# Patient Record
Sex: FEMALE | Race: BLACK OR AFRICAN AMERICAN | NOT HISPANIC OR LATINO | Employment: UNEMPLOYED | URBAN - METROPOLITAN AREA
[De-identification: names, ages, dates, MRNs, and addresses within clinical notes are randomized per-mention and may not be internally consistent; named-entity substitution may affect disease eponyms.]

---

## 2023-09-12 PROBLEM — M79.10 MYALGIA: Status: ACTIVE | Noted: 2023-09-12

## 2023-09-12 PROBLEM — S69.92XA INJURY OF LEFT THUMB: Status: ACTIVE | Noted: 2023-09-12

## 2023-09-12 PROBLEM — R07.89 TIGHT CHEST: Status: ACTIVE | Noted: 2023-09-12

## 2023-09-12 PROBLEM — K90.9 MALABSORPTION (HHS-HCC): Status: ACTIVE | Noted: 2023-09-12

## 2023-09-12 PROBLEM — N93.9 ABNORMAL VAGINAL BLEEDING: Status: ACTIVE | Noted: 2023-09-12

## 2023-09-12 PROBLEM — M23.305 DERANGEMENT OF MEDIAL MENISCUS: Status: ACTIVE | Noted: 2023-09-12

## 2023-09-12 PROBLEM — E66.9 OBESITY: Status: ACTIVE | Noted: 2023-09-12

## 2023-09-12 PROBLEM — E11.9 DIABETES MELLITUS WITHOUT COMPLICATION (MULTI): Status: ACTIVE | Noted: 2023-09-12

## 2023-09-12 PROBLEM — E55.9 VITAMIN D DEFICIENCY: Status: ACTIVE | Noted: 2023-09-12

## 2023-09-12 PROBLEM — N39.3 URINARY, INCONTINENCE, STRESS FEMALE: Status: ACTIVE | Noted: 2023-09-12

## 2023-09-12 PROBLEM — R07.9 CHEST PAIN: Status: ACTIVE | Noted: 2023-09-12

## 2023-09-12 PROBLEM — I10 HYPERTENSIVE DISORDER: Status: ACTIVE | Noted: 2023-09-12

## 2023-09-12 PROBLEM — I50.9 HEART FAILURE (MULTI): Status: ACTIVE | Noted: 2023-09-12

## 2023-09-12 PROBLEM — G47.33 OBSTRUCTIVE SLEEP APNEA SYNDROME: Status: ACTIVE | Noted: 2023-09-12

## 2023-09-12 RX ORDER — SITAGLIPTIN AND METFORMIN HYDROCHLORIDE 500; 50 MG/1; MG/1
TABLET, FILM COATED ORAL
COMMUNITY
Start: 2017-08-03

## 2023-09-12 RX ORDER — OMEPRAZOLE 40 MG/1
1 CAPSULE, DELAYED RELEASE ORAL DAILY
COMMUNITY

## 2023-09-12 RX ORDER — ACETAMINOPHEN 160 MG/5ML
SUSPENSION ORAL
COMMUNITY

## 2023-09-12 RX ORDER — PEN NEEDLE, DIABETIC 30 GX3/16"
NEEDLE, DISPOSABLE MISCELLANEOUS
COMMUNITY
Start: 2013-09-09

## 2023-09-12 RX ORDER — INSULIN ASPART 100 [IU]/ML
INJECTION, SOLUTION INTRAVENOUS; SUBCUTANEOUS
COMMUNITY
Start: 2013-09-09

## 2023-09-12 RX ORDER — INSULIN GLARGINE 300 U/ML
INJECTION, SOLUTION SUBCUTANEOUS
COMMUNITY
Start: 2017-08-02

## 2023-09-12 RX ORDER — GUAIFENESIN 1200 MG
325 TABLET, EXTENDED RELEASE 12 HR ORAL EVERY 4 HOURS
COMMUNITY

## 2023-09-12 RX ORDER — AMLODIPINE BESYLATE 5 MG/1
5 TABLET ORAL DAILY
COMMUNITY
Start: 2014-10-01

## 2023-09-12 RX ORDER — LOSARTAN POTASSIUM AND HYDROCHLOROTHIAZIDE 12.5; 1 MG/1; MG/1
TABLET ORAL
COMMUNITY
Start: 2013-09-09

## 2023-09-12 RX ORDER — INSULIN GLARGINE 100 [IU]/ML
INJECTION, SOLUTION SUBCUTANEOUS
COMMUNITY
Start: 2013-09-09

## 2023-09-12 RX ORDER — IBUPROFEN 800 MG/1
1 TABLET ORAL 3 TIMES DAILY
COMMUNITY
Start: 2018-06-14

## 2023-09-12 RX ORDER — LOSARTAN POTASSIUM 100 MG/1
100 TABLET ORAL 2 TIMES DAILY
COMMUNITY
Start: 2014-10-01

## 2023-09-12 RX ORDER — AMITRIPTYLINE HYDROCHLORIDE 10 MG/1
10 TABLET, FILM COATED ORAL NIGHTLY
COMMUNITY

## 2023-09-12 RX ORDER — BLOOD-GLUCOSE METER
KIT MISCELLANEOUS
COMMUNITY
Start: 2018-03-08

## 2023-09-12 RX ORDER — CHOLECALCIFEROL (VITAMIN D3) 25 MCG
TABLET,CHEWABLE ORAL
COMMUNITY

## 2023-09-12 RX ORDER — CARVEDILOL 6.25 MG/1
6.25 TABLET ORAL DAILY
COMMUNITY
Start: 2013-11-04

## 2023-09-12 RX ORDER — ASPIRIN 81 MG/1
TABLET ORAL
COMMUNITY
Start: 2013-09-09

## 2023-10-16 DIAGNOSIS — E53.8 VITAMIN B12 DEFICIENCY: ICD-10-CM

## 2023-10-16 DIAGNOSIS — E55.9 VITAMIN D DEFICIENCY: Primary | ICD-10-CM

## 2023-10-16 DIAGNOSIS — E21.3 HYPERPARATHYROIDISM (MULTI): ICD-10-CM

## 2023-10-16 DIAGNOSIS — K90.9 INTESTINAL MALABSORPTION, UNSPECIFIED TYPE (HHS-HCC): ICD-10-CM

## 2023-11-13 ENCOUNTER — LAB (OUTPATIENT)
Dept: LAB | Facility: LAB | Age: 53
End: 2023-11-13
Payer: MEDICARE

## 2023-11-13 DIAGNOSIS — E53.8 VITAMIN B12 DEFICIENCY: ICD-10-CM

## 2023-11-13 DIAGNOSIS — E55.9 VITAMIN D DEFICIENCY: ICD-10-CM

## 2023-11-13 DIAGNOSIS — E21.3 HYPERPARATHYROIDISM (MULTI): ICD-10-CM

## 2023-11-13 DIAGNOSIS — E11.8 TYPE 2 DIABETES MELLITUS WITH UNSPECIFIED COMPLICATIONS (MULTI): Primary | ICD-10-CM

## 2023-11-13 DIAGNOSIS — K90.9 INTESTINAL MALABSORPTION, UNSPECIFIED TYPE (HHS-HCC): ICD-10-CM

## 2023-11-13 LAB
ALBUMIN SERPL-MCNC: 4.2 G/DL (ref 3.5–5)
ALP BLD-CCNC: 54 U/L (ref 35–125)
ALT SERPL-CCNC: 24 U/L (ref 5–40)
ANION GAP SERPL CALC-SCNC: 13 MMOL/L
AST SERPL-CCNC: 18 U/L (ref 5–40)
BASOPHILS # BLD AUTO: 0.01 X10*3/UL (ref 0–0.1)
BASOPHILS NFR BLD AUTO: 0.1 %
BILIRUB SERPL-MCNC: 0.2 MG/DL (ref 0.1–1.2)
BUN SERPL-MCNC: 20 MG/DL (ref 8–25)
CALCIUM SERPL-MCNC: 9.5 MG/DL (ref 8.5–10.4)
CHLORIDE SERPL-SCNC: 103 MMOL/L (ref 97–107)
CHOLEST SERPL-MCNC: 209 MG/DL (ref 133–200)
CHOLEST/HDLC SERPL: 2.1 {RATIO}
CO2 SERPL-SCNC: 22 MMOL/L (ref 24–31)
CREAT SERPL-MCNC: 0.8 MG/DL (ref 0.4–1.6)
CREAT UR-MCNC: 56 MG/DL
EOSINOPHIL # BLD AUTO: 0.01 X10*3/UL (ref 0–0.7)
EOSINOPHIL NFR BLD AUTO: 0.1 %
ERYTHROCYTE [DISTWIDTH] IN BLOOD BY AUTOMATED COUNT: 15.1 % (ref 11.5–14.5)
EST. AVERAGE GLUCOSE BLD GHB EST-MCNC: 160 MG/DL
FERRITIN SERPL-MCNC: 206 NG/ML (ref 13–150)
FOLATE SERPL-MCNC: >20 NG/ML (ref 4.2–19.9)
GFR SERPL CREATININE-BSD FRML MDRD: 88 ML/MIN/1.73M*2
GLUCOSE SERPL-MCNC: 141 MG/DL (ref 65–99)
HBA1C MFR BLD: 7.2 %
HCT VFR BLD AUTO: 44.6 % (ref 36–46)
HDLC SERPL-MCNC: 101 MG/DL
HGB BLD-MCNC: 14.6 G/DL (ref 12–16)
IMM GRANULOCYTES # BLD AUTO: 0.06 X10*3/UL (ref 0–0.7)
IMM GRANULOCYTES NFR BLD AUTO: 0.5 % (ref 0–0.9)
IRON SATN MFR SERPL: 26 % (ref 12–50)
IRON SERPL-MCNC: 75 UG/DL (ref 30–160)
LDLC SERPL CALC-MCNC: 92 MG/DL (ref 65–130)
LYMPHOCYTES # BLD AUTO: 2.39 X10*3/UL (ref 1.2–4.8)
LYMPHOCYTES NFR BLD AUTO: 21.6 %
MCH RBC QN AUTO: 28.9 PG (ref 26–34)
MCHC RBC AUTO-ENTMCNC: 32.7 G/DL (ref 32–36)
MCV RBC AUTO: 88 FL (ref 80–100)
MICROALBUMIN UR-MCNC: <12 MG/L (ref 0–23)
MICROALBUMIN/CREAT UR: NORMAL MG/G{CREAT}
MONOCYTES # BLD AUTO: 0.54 X10*3/UL (ref 0.1–1)
MONOCYTES NFR BLD AUTO: 4.9 %
NEUTROPHILS # BLD AUTO: 8.06 X10*3/UL (ref 1.2–7.7)
NEUTROPHILS NFR BLD AUTO: 72.8 %
NRBC BLD-RTO: 0 /100 WBCS (ref 0–0)
PLATELET # BLD AUTO: 247 X10*3/UL (ref 150–450)
POTASSIUM SERPL-SCNC: 4.5 MMOL/L (ref 3.4–5.1)
PROT SERPL-MCNC: 7 G/DL (ref 5.9–7.9)
PTH-INTACT SERPL-MCNC: 25.1 PG/ML (ref 18.5–88)
RBC # BLD AUTO: 5.06 X10*6/UL (ref 4–5.2)
SODIUM SERPL-SCNC: 138 MMOL/L (ref 133–145)
TIBC SERPL-MCNC: 293 UG/DL (ref 228–428)
TRIGL SERPL-MCNC: 79 MG/DL (ref 40–150)
UIBC SERPL-MCNC: 218 UG/DL (ref 110–370)
VIT B12 SERPL-MCNC: 1660 PG/ML (ref 211–946)
WBC # BLD AUTO: 11.1 X10*3/UL (ref 4.4–11.3)

## 2023-11-13 PROCEDURE — 36415 COLL VENOUS BLD VENIPUNCTURE: CPT

## 2023-11-13 PROCEDURE — 83540 ASSAY OF IRON: CPT

## 2023-11-13 PROCEDURE — 82607 VITAMIN B-12: CPT

## 2023-11-13 PROCEDURE — 82728 ASSAY OF FERRITIN: CPT

## 2023-11-13 PROCEDURE — 82652 VIT D 1 25-DIHYDROXY: CPT

## 2023-11-13 PROCEDURE — 84630 ASSAY OF ZINC: CPT

## 2023-11-13 PROCEDURE — 82570 ASSAY OF URINE CREATININE: CPT

## 2023-11-13 PROCEDURE — 85025 COMPLETE CBC W/AUTO DIFF WBC: CPT

## 2023-11-13 PROCEDURE — 82043 UR ALBUMIN QUANTITATIVE: CPT

## 2023-11-13 PROCEDURE — 84425 ASSAY OF VITAMIN B-1: CPT

## 2023-11-13 PROCEDURE — 83036 HEMOGLOBIN GLYCOSYLATED A1C: CPT

## 2023-11-13 PROCEDURE — 82746 ASSAY OF FOLIC ACID SERUM: CPT

## 2023-11-13 PROCEDURE — 80061 LIPID PANEL: CPT

## 2023-11-13 PROCEDURE — 82525 ASSAY OF COPPER: CPT

## 2023-11-13 PROCEDURE — 80053 COMPREHEN METABOLIC PANEL: CPT

## 2023-11-13 PROCEDURE — 83550 IRON BINDING TEST: CPT

## 2023-11-13 PROCEDURE — 83970 ASSAY OF PARATHORMONE: CPT

## 2023-11-15 LAB
1,25(OH)2D3 SERPL-MCNC: 73.4 PG/ML (ref 19.9–79.3)
COPPER SERPL-MCNC: 127.4 UG/DL (ref 80–155)
ZINC SERPL-MCNC: 71 UG/DL (ref 60–120)

## 2023-11-17 LAB — VIT B1 PYROPHOSHATE BLD-SCNC: 124 NMOL/L (ref 70–180)

## 2023-12-05 ENCOUNTER — HOSPITAL ENCOUNTER (OUTPATIENT)
Dept: RADIOLOGY | Facility: HOSPITAL | Age: 53
Discharge: HOME | End: 2023-12-05
Payer: MEDICARE

## 2023-12-05 DIAGNOSIS — Z12.31 ENCOUNTER FOR SCREENING MAMMOGRAM FOR MALIGNANT NEOPLASM OF BREAST: ICD-10-CM

## 2024-01-03 ENCOUNTER — HOSPITAL ENCOUNTER (OUTPATIENT)
Dept: RADIOLOGY | Facility: HOSPITAL | Age: 54
Discharge: HOME | End: 2024-01-03
Payer: MEDICARE

## 2024-01-03 VITALS — HEIGHT: 58 IN | BODY MASS INDEX: 27.71 KG/M2 | WEIGHT: 132 LBS

## 2024-01-03 PROCEDURE — 77067 SCR MAMMO BI INCL CAD: CPT

## 2024-04-04 DIAGNOSIS — K90.9 INTESTINAL MALABSORPTION, UNSPECIFIED TYPE (HHS-HCC): ICD-10-CM

## 2024-04-04 DIAGNOSIS — E55.9 VITAMIN D DEFICIENCY: ICD-10-CM

## 2024-04-04 DIAGNOSIS — E53.8 VITAMIN B 12 DEFICIENCY: ICD-10-CM

## 2024-10-23 ENCOUNTER — OFFICE VISIT (OUTPATIENT)
Dept: DERMATOLOGY | Facility: CLINIC | Age: 54
End: 2024-10-23
Payer: COMMERCIAL

## 2024-10-23 DIAGNOSIS — L84 CORNS AND CALLOSITIES: Primary | ICD-10-CM

## 2024-10-23 DIAGNOSIS — L64.9 ANDROGENETIC ALOPECIA: ICD-10-CM

## 2024-10-23 DIAGNOSIS — D22.9 BENIGN NEVUS: ICD-10-CM

## 2024-10-23 PROCEDURE — 1036F TOBACCO NON-USER: CPT | Performed by: NURSE PRACTITIONER

## 2024-10-23 PROCEDURE — 99203 OFFICE O/P NEW LOW 30 MIN: CPT | Performed by: NURSE PRACTITIONER

## 2024-10-23 PROCEDURE — 4010F ACE/ARB THERAPY RXD/TAKEN: CPT | Performed by: NURSE PRACTITIONER

## 2024-10-23 RX ORDER — CARVEDILOL 12.5 MG/1
12.5 TABLET ORAL
COMMUNITY
Start: 2024-07-26

## 2024-10-23 RX ORDER — AMLODIPINE BESYLATE 10 MG/1
1 TABLET ORAL
COMMUNITY
Start: 2024-07-26

## 2024-10-23 ASSESSMENT — DERMATOLOGY QUALITY OF LIFE (QOL) ASSESSMENT
RATE HOW BOTHERED YOU ARE BY EFFECTS OF YOUR SKIN PROBLEMS ON YOUR ACTIVITIES (EG, GOING OUT, ACCOMPLISHING WHAT YOU WANT, WORK ACTIVITIES OR YOUR RELATIONSHIPS WITH OTHERS): 3
RATE HOW BOTHERED YOU ARE BY SYMPTOMS OF YOUR SKIN PROBLEM (EG, ITCHING, STINGING BURNING, HURTING OR SKIN IRRITATION): 3
RATE HOW BOTHERED YOU ARE BY SYMPTOMS OF YOUR SKIN PROBLEM (EG, ITCHING, STINGING BURNING, HURTING OR SKIN IRRITATION): 3
RATE HOW BOTHERED YOU ARE BY EFFECTS OF YOUR SKIN PROBLEMS ON YOUR ACTIVITIES (EG, GOING OUT, ACCOMPLISHING WHAT YOU WANT, WORK ACTIVITIES OR YOUR RELATIONSHIPS WITH OTHERS): 3
RATE HOW EMOTIONALLY BOTHERED YOU ARE BY YOUR SKIN PROBLEM (FOR EXAMPLE, WORRY, EMBARRASSMENT, FRUSTRATION): 6 - ALWAYS BOTHERED
RATE HOW EMOTIONALLY BOTHERED YOU ARE BY YOUR SKIN PROBLEM (FOR EXAMPLE, WORRY, EMBARRASSMENT, FRUSTRATION): 6 - ALWAYS BOTHERED

## 2024-10-23 NOTE — PROGRESS NOTES
Subjective     Mame Franklin is a 54 y.o. female who presents for the following: Alopecia.     Review of Systems:  No other skin or systemic complaints other than what is documented elsewhere in the note.    The following portions of the chart were reviewed this encounter and updated as appropriate:   Tobacco  Allergies  Meds  Problems  Med Hx  Surg Hx         Skin Cancer History  No skin cancer on file.      Specialty Problems          Dermatology Problems    Cicatricial alopecia, unspecified        Objective   Well appearing patient in no apparent distress; mood and affect are within normal limits.    A focused skin examination was performed. All findings within normal limits unless otherwise noted below.    Assessment/Plan   1. Corns and callosities  Left 3rd Metatarsal Plantar Area  Flesh colored hyperkeratotic papule(s)     Corns are thickenings of the skin composed of keratin (a type of protein that forms in the hair, skin, and nails). They are typically found on the toes. They are caused by repeated friction or pressure to the involved area.    Corns are considered either hard or soft, depending on their location and appearance.  Hard corns typically affect the tops or bottoms of the toes and appear as a well-defined, dry, thickened area of skin.  Soft corns occur between the toes and are whiter, softer, and moister in appearance due to continuous softening by sweat.    Discussed risks, benefits and alternatives of pairing corns with side effects including infection, bleeding, superficial wounding, and incomplete resolution of the corn. The patient verbalized understanding and agreement and wants to proceed with pairing. The skin was preped with alcohol wipe and allowed to air dry. A #4 currette was used to pair the callus and remove central core area. Patient tolerated the treatment well with minimal to scant bleeding with hemostasis achieved with brief pressure. Thin layer of vaseline was applied and  area covered with bandaid. Patient to clean skin with soap and water daily and reapply vaseline and bandaid until healed.     For at home maintenance, we discussed use of corn pads or OTC 40% salicylic acid as needed. Also discussed considering follow up with podiatry for evaluation for appropriate footwear.     2. Benign nevus  Left 2nd Metatarsal Plantar Area  10 x 5 mm dark brown macule ( a few other similar lesions noted on the left foot but <6 mm) with furrow only pigmentation.     Advised to monitor. DDx: Nevus vs melanosis vs less likely MM. The present appearance of the lesion is not worrisome but it should continue to be observed and testing/treatment may be warranted if change (shape, size, color, pain, bleeding, itch) occurs. Patient verbalized understanding and agreement.       3. Androgenetic alopecia  Scalp  Diffuse thinning of the crown and widening of the midline part with retention of the frontal hairline.    -Discussed the mechanism of action of androgenetic alopecia, including the slowly progressive nature of the condition and the need for any therapy to be continued long term to maintain results.  -Discussed available therapies and anticipated efficacy, including topical and/or oral and/or injectable treatments.   -Recommend minoxidil 5% solution, apply BID for best results. This topical medication takes 6 months to gauge benefit and continual use to maintain that benefit. Potential side effects include irritation/rash/itching of the scalp and very rare chance of rapid heartbeat. Discontinue use if side effects occur and contact our office.     Plan  Start with OTC 5% minoxidil BID.             Return to clinic as needed.

## 2024-10-24 ENCOUNTER — HOSPITAL ENCOUNTER (OUTPATIENT)
Dept: RADIOLOGY | Facility: HOSPITAL | Age: 54
Discharge: HOME | End: 2024-10-24
Payer: COMMERCIAL

## 2024-10-24 DIAGNOSIS — Z78.0 ASYMPTOMATIC MENOPAUSAL STATE: ICD-10-CM

## 2024-10-24 PROCEDURE — 77080 DXA BONE DENSITY AXIAL: CPT | Performed by: RADIOLOGY

## 2024-10-24 PROCEDURE — 77080 DXA BONE DENSITY AXIAL: CPT

## 2024-11-07 ENCOUNTER — LAB (OUTPATIENT)
Dept: LAB | Facility: LAB | Age: 54
End: 2024-11-07
Payer: COMMERCIAL

## 2024-11-07 DIAGNOSIS — E53.8 VITAMIN B 12 DEFICIENCY: ICD-10-CM

## 2024-11-07 DIAGNOSIS — K90.9 INTESTINAL MALABSORPTION, UNSPECIFIED TYPE (HHS-HCC): ICD-10-CM

## 2024-11-07 DIAGNOSIS — E55.9 VITAMIN D DEFICIENCY: ICD-10-CM

## 2024-11-07 LAB
25(OH)D3 SERPL-MCNC: 54 NG/ML (ref 30–100)
ALBUMIN SERPL BCP-MCNC: 4.2 G/DL (ref 3.4–5)
ALP SERPL-CCNC: 42 U/L (ref 33–110)
ALT SERPL W P-5'-P-CCNC: 15 U/L (ref 7–45)
ANION GAP SERPL CALCULATED.3IONS-SCNC: 11 MMOL/L (ref 10–20)
AST SERPL W P-5'-P-CCNC: 17 U/L (ref 9–39)
BASOPHILS # BLD AUTO: 0.03 X10*3/UL (ref 0–0.1)
BASOPHILS NFR BLD AUTO: 0.4 %
BILIRUB SERPL-MCNC: 0.5 MG/DL (ref 0–1.2)
BUN SERPL-MCNC: 13 MG/DL (ref 6–23)
CALCIUM SERPL-MCNC: 9.1 MG/DL (ref 8.6–10.3)
CHLORIDE SERPL-SCNC: 106 MMOL/L (ref 98–107)
CO2 SERPL-SCNC: 28 MMOL/L (ref 21–32)
CREAT SERPL-MCNC: 0.76 MG/DL (ref 0.5–1.05)
EGFRCR SERPLBLD CKD-EPI 2021: >90 ML/MIN/1.73M*2
EOSINOPHIL # BLD AUTO: 0.06 X10*3/UL (ref 0–0.7)
EOSINOPHIL NFR BLD AUTO: 0.8 %
ERYTHROCYTE [DISTWIDTH] IN BLOOD BY AUTOMATED COUNT: 14.8 % (ref 11.5–14.5)
FERRITIN SERPL-MCNC: 174 NG/ML (ref 8–150)
FOLATE SERPL-MCNC: 23.4 NG/ML
GLUCOSE SERPL-MCNC: 83 MG/DL (ref 74–99)
HCT VFR BLD AUTO: 34.7 % (ref 36–46)
HGB BLD-MCNC: 11.7 G/DL (ref 12–16)
IMM GRANULOCYTES # BLD AUTO: 0.01 X10*3/UL (ref 0–0.7)
IMM GRANULOCYTES NFR BLD AUTO: 0.1 % (ref 0–0.9)
IRON SATN MFR SERPL: 37 % (ref 25–45)
IRON SERPL-MCNC: 111 UG/DL (ref 35–150)
LYMPHOCYTES # BLD AUTO: 4.05 X10*3/UL (ref 1.2–4.8)
LYMPHOCYTES NFR BLD AUTO: 52.2 %
MCH RBC QN AUTO: 29.1 PG (ref 26–34)
MCHC RBC AUTO-ENTMCNC: 33.7 G/DL (ref 32–36)
MCV RBC AUTO: 86 FL (ref 80–100)
MONOCYTES # BLD AUTO: 0.54 X10*3/UL (ref 0.1–1)
MONOCYTES NFR BLD AUTO: 7 %
NEUTROPHILS # BLD AUTO: 3.07 X10*3/UL (ref 1.2–7.7)
NEUTROPHILS NFR BLD AUTO: 39.5 %
NRBC BLD-RTO: 0 /100 WBCS (ref 0–0)
PLATELET # BLD AUTO: 241 X10*3/UL (ref 150–450)
POTASSIUM SERPL-SCNC: 4.4 MMOL/L (ref 3.5–5.3)
PROT SERPL-MCNC: 6.6 G/DL (ref 6.4–8.2)
PTH-INTACT SERPL-MCNC: 33.2 PG/ML (ref 18.5–88)
RBC # BLD AUTO: 4.02 X10*6/UL (ref 4–5.2)
SODIUM SERPL-SCNC: 141 MMOL/L (ref 136–145)
TIBC SERPL-MCNC: 304 UG/DL (ref 240–445)
UIBC SERPL-MCNC: 193 UG/DL (ref 110–370)
VIT B12 SERPL-MCNC: 1191 PG/ML (ref 211–911)
WBC # BLD AUTO: 7.8 X10*3/UL (ref 4.4–11.3)

## 2024-11-07 PROCEDURE — 80053 COMPREHEN METABOLIC PANEL: CPT

## 2024-11-07 PROCEDURE — 82607 VITAMIN B-12: CPT

## 2024-11-07 PROCEDURE — 36415 COLL VENOUS BLD VENIPUNCTURE: CPT

## 2024-11-07 PROCEDURE — 82728 ASSAY OF FERRITIN: CPT

## 2024-11-07 PROCEDURE — 84630 ASSAY OF ZINC: CPT

## 2024-11-07 PROCEDURE — 82525 ASSAY OF COPPER: CPT

## 2024-11-07 PROCEDURE — 84425 ASSAY OF VITAMIN B-1: CPT

## 2024-11-07 PROCEDURE — 82306 VITAMIN D 25 HYDROXY: CPT

## 2024-11-07 PROCEDURE — 83970 ASSAY OF PARATHORMONE: CPT

## 2024-11-07 PROCEDURE — 83550 IRON BINDING TEST: CPT

## 2024-11-07 PROCEDURE — 83540 ASSAY OF IRON: CPT

## 2024-11-07 PROCEDURE — 85025 COMPLETE CBC W/AUTO DIFF WBC: CPT

## 2024-11-07 PROCEDURE — 82746 ASSAY OF FOLIC ACID SERUM: CPT

## 2024-11-08 NOTE — PROGRESS NOTES
Subjective   Patient ID: Mame Franklin is a 54 y.o. female who presents for No chief complaint on file..  HPI  6 YR FUV RYGB  START WT:  195  IDEAL WT:  120 START EXCESS: 75 TWL: 77  EWL:  102 % HT:58  IN.  LABS ARE DONE IN EPIC FOR REVIEW  Review of Systems  Bariatric Surgery F/U:          Seen at ER after surgery? No.  Hospital admission? No.  Bariatric reoperation? No.  Bariatric intervention? No.  Was anticoagulation initiated for presumed/confirmed Vein Thrombosis/PE? No.  Was an incisional hernia noted on exam? No.  Sleep Apnea? No.  Still using C-PAP? No.  GERD req. meds? No.  Hyperlipidemia? No.  Still taking Cholesterol med? No.  Hypertension? Yes.  Still taking HTN med? Yes.  Number of Anti-hypertensive Medications: 3.  Diabetes? No.  Still taking DM med? No.  Current DM medication type: _____.      CONSTITUTIONAL:          Chills No.  Fatigue No.  Fever No.      CARDIOLOGY:          Negative for dizziness, chest pain, palpitations, shortness of breath.      RESPIRATORY:          Sleep Apnea No.  Negative for chest congestion, cough, wheezing.      GASTROENTEROLOGY:          Food Intolerance Yes.  Abdominal pain No.  Acid reflux NO.  Black stools No.  Constipation yes.  Diarrhea No.  Loss of appetite no.  Nausea No.  Vomiting No.      UROLOGY:          Negative for dysuria, urinary urgency, kidney stones.      PSYCHOLOGY:          Negative for depression, anxiety, high stress level.    Objective   Physical Exam    Assessment/Plan            Tracy Mendes LPN 11/08/24 2:46 PM

## 2024-11-09 LAB
COPPER SERPL-MCNC: 103.6 UG/DL (ref 80–155)
ZINC SERPL-MCNC: 67.7 UG/DL (ref 60–120)

## 2024-11-14 ENCOUNTER — APPOINTMENT (OUTPATIENT)
Dept: SURGERY | Facility: CLINIC | Age: 54
End: 2024-11-14
Payer: COMMERCIAL

## 2024-11-14 ENCOUNTER — NUTRITION (OUTPATIENT)
Dept: SURGERY | Facility: CLINIC | Age: 54
End: 2024-11-14

## 2024-11-14 VITALS
WEIGHT: 118 LBS | BODY MASS INDEX: 24.77 KG/M2 | HEIGHT: 58 IN | HEART RATE: 79 BPM | DIASTOLIC BLOOD PRESSURE: 94 MMHG | SYSTOLIC BLOOD PRESSURE: 136 MMHG

## 2024-11-14 DIAGNOSIS — D50.9 IRON DEFICIENCY ANEMIA, UNSPECIFIED IRON DEFICIENCY ANEMIA TYPE: Primary | ICD-10-CM

## 2024-11-14 DIAGNOSIS — E53.8 VITAMIN B12 DEFICIENCY: ICD-10-CM

## 2024-11-14 DIAGNOSIS — K90.9 INTESTINAL MALABSORPTION, UNSPECIFIED TYPE (HHS-HCC): ICD-10-CM

## 2024-11-14 DIAGNOSIS — Z98.84 S/P GASTRIC BYPASS: ICD-10-CM

## 2024-11-14 DIAGNOSIS — E55.9 VITAMIN D DEFICIENCY: ICD-10-CM

## 2024-11-14 DIAGNOSIS — E21.3 HYPERPARATHYROIDISM (MULTI): ICD-10-CM

## 2024-11-14 LAB — VIT B1 PYROPHOSHATE BLD-SCNC: 103 NMOL/L (ref 70–180)

## 2024-11-14 PROCEDURE — 3075F SYST BP GE 130 - 139MM HG: CPT | Performed by: SURGERY

## 2024-11-14 PROCEDURE — 3008F BODY MASS INDEX DOCD: CPT | Performed by: SURGERY

## 2024-11-14 PROCEDURE — 3080F DIAST BP >= 90 MM HG: CPT | Performed by: SURGERY

## 2024-11-14 PROCEDURE — 99213 OFFICE O/P EST LOW 20 MIN: CPT | Performed by: SURGERY

## 2024-11-14 PROCEDURE — 4010F ACE/ARB THERAPY RXD/TAKEN: CPT | Performed by: SURGERY

## 2024-11-14 ASSESSMENT — COLUMBIA-SUICIDE SEVERITY RATING SCALE - C-SSRS
1. IN THE PAST MONTH, HAVE YOU WISHED YOU WERE DEAD OR WISHED YOU COULD GO TO SLEEP AND NOT WAKE UP?: NO
2. HAVE YOU ACTUALLY HAD ANY THOUGHTS OF KILLING YOURSELF?: NO
6. HAVE YOU EVER DONE ANYTHING, STARTED TO DO ANYTHING, OR PREPARED TO DO ANYTHING TO END YOUR LIFE?: NO

## 2024-11-14 ASSESSMENT — LIFESTYLE VARIABLES
AUDIT-C TOTAL SCORE: 1
HOW OFTEN DO YOU HAVE SIX OR MORE DRINKS ON ONE OCCASION: NEVER
HOW MANY STANDARD DRINKS CONTAINING ALCOHOL DO YOU HAVE ON A TYPICAL DAY: 1 OR 2
HOW OFTEN DO YOU HAVE A DRINK CONTAINING ALCOHOL: MONTHLY OR LESS
SKIP TO QUESTIONS 9-10: 1

## 2024-11-14 ASSESSMENT — PATIENT HEALTH QUESTIONNAIRE - PHQ9
1. LITTLE INTEREST OR PLEASURE IN DOING THINGS: NOT AT ALL
SUM OF ALL RESPONSES TO PHQ9 QUESTIONS 1 AND 2: 0
2. FEELING DOWN, DEPRESSED OR HOPELESS: NOT AT ALL

## 2024-11-14 ASSESSMENT — PAIN SCALES - GENERAL: PAINLEVEL_OUTOF10: 5

## 2024-11-14 NOTE — H&P
History Of Present Illness  Mame Franklin is a 54 y.o. woman here for 6 year follow up from RYGB.  She has no reflux.  She was reminded to avoid ASA and NSAIDs for life.  She takes an mvi, B12 sublingual, calcium citrate bid.  She had labwork for this visit.  She has anemia.  Her hematocrit went from 44.6 to 34.7 in one year.  She denies black or maroon stools.  She does not know how much iron is in her mvi.  She had a colonoscopy two years ago and had polypectomy.  She was told to have follow up colonoscopy five years from then which is three years from now.  She is cold all of the time.  She does not exercise consistently because she has right knee arthritis.  She has no hunger on ozempic.  Her meal volume is small.  She has protein shake for breakfast.  Prior to ozempic she could eat a saucer of food.       Past Medical History  She has a past medical history of Osteoarthritis of knee, unspecified, Personal history of other diseases of the circulatory system, and Personal history of other endocrine, nutritional and metabolic disease.    Surgical History  She has a past surgical history that includes Tubal ligation (12/19/2013); MR angio neck wo IV contrast (05/17/2016); MR angio head wo IV contrast (05/17/2016); Hysterectomy; and Gastric bypass (11/19/2018).     Social History  She reports that she has never smoked. She has never used smokeless tobacco. She reports current alcohol use of about 2.0 standard drinks of alcohol per week. She reports current drug use. Frequency: 2.00 times per week. Drug: Marijuana.    Family History  Family History   Problem Relation Name Age of Onset    Hypertension Mother      Diabetes Mother      Heart failure Mother      Arthritis Mother      Arthritis Father      Diabetes Father      Obesity Sister      Hypertension Sister      Hypertension Brother      Diabetes Brother      Obesity Brother      Pulmonary embolism Brother      Obesity Daughter      Obesity Son      Kidney  "disease Son      Deep vein thrombosis Son          Allergies  Amoxicillin and Metformin    Review of Systems   een at ER after surgery? No.  Hospital admission? No.  Bariatric reoperation? No.  Bariatric intervention? No.  Was anticoagulation initiated for presumed/confirmed Vein Thrombosis/PE? No.  Was an incisional hernia noted on exam? No.  Sleep Apnea? No.  Still using C-PAP? No.  GERD req. meds? No.  Hyperlipidemia? No.  Still taking Cholesterol med? No.  Hypertension? Yes.  Still taking HTN med? Yes.  Number of Anti-hypertensive Medications: 3.  Diabetes? No.  Still taking DM med? No.  Current DM medication type: _____.      CONSTITUTIONAL:          Chills No.  Fatigue No.  Fever No.      CARDIOLOGY:          Negative for dizziness, chest pain, palpitations, shortness of breath.      RESPIRATORY:          Sleep Apnea No.  Negative for chest congestion, cough, wheezing.      GASTROENTEROLOGY:          Food Intolerance Yes.  Abdominal pain No.  Acid reflux NO.  Black stools No.  Constipation yes.  Diarrhea No.  Loss of appetite no.  Nausea No.  Vomiting No.      UROLOGY:          Negative for dysuria, urinary urgency, kidney stones.      PSYCHOLOGY:          Negative for depression, anxiety, high stress level.         Last Recorded Vitals  Blood pressure (!) 136/94, pulse 79, height 1.473 m (4' 10\"), weight 53.5 kg (118 lb).    Relevant Results   Latest Reference Range & Units 11/07/24 08:46   GLUCOSE 74 - 99 mg/dL 83   SODIUM 136 - 145 mmol/L 141   POTASSIUM 3.5 - 5.3 mmol/L 4.4   CHLORIDE 98 - 107 mmol/L 106   Bicarbonate 21 - 32 mmol/L 28   Anion Gap 10 - 20 mmol/L 11   Blood Urea Nitrogen 6 - 23 mg/dL 13   Creatinine 0.50 - 1.05 mg/dL 0.76   EGFR >60 mL/min/1.73m*2 >90   Calcium 8.6 - 10.3 mg/dL 9.1   Albumin 3.4 - 5.0 g/dL 4.2   Alkaline Phosphatase 33 - 110 U/L 42   ALT 7 - 45 U/L 15   AST 9 - 39 U/L 17   Bilirubin Total 0.0 - 1.2 mg/dL 0.5   FERRITIN 8 - 150 ng/mL 174 (H)   FOLATE >5.0 ng/mL 23.4   Total " Protein 6.4 - 8.2 g/dL 6.6   IRON 35 - 150 ug/dL 111   TIBC 240 - 445 ug/dL 304   UIBC 110 - 370 ug/dL 193   % Saturation 25 - 45 % 37   Vitamin B12 211 - 911 pg/mL 1,191 (H)   Copper 80.0 - 155.0 ug/dL 103.6   Zinc, Serum or Plasma 60.0 - 120.0 ug/dL 67.7   Parathyroid Hormone, Intact 18.5 - 88.0 pg/mL 33.2   Vitamin D, 25-Hydroxy, Total 30 - 100 ng/mL 54   WBC 4.4 - 11.3 x10*3/uL 7.8   nRBC 0.0 - 0.0 /100 WBCs 0.0   RBC 4.00 - 5.20 x10*6/uL 4.02   HEMOGLOBIN 12.0 - 16.0 g/dL 11.7 (L)   HEMATOCRIT 36.0 - 46.0 % 34.7 (L)   MCV 80 - 100 fL 86   MCH 26.0 - 34.0 pg 29.1   MCHC 32.0 - 36.0 g/dL 33.7   RED CELL DISTRIBUTION WIDTH 11.5 - 14.5 % 14.8 (H)   Platelets 150 - 450 x10*3/uL 241   Neutrophils % 40.0 - 80.0 % 39.5   Immature Granulocytes %, Automated 0.0 - 0.9 % 0.1   Lymphocytes % 13.0 - 44.0 % 52.2   Monocytes % 2.0 - 10.0 % 7.0   Eosinophils % 0.0 - 6.0 % 0.8   Basophils % 0.0 - 2.0 % 0.4   Neutrophils Absolute 1.20 - 7.70 x10*3/uL 3.07   Immature Granulocytes Absolute, Automated 0.00 - 0.70 x10*3/uL 0.01   Lymphocytes Absolute 1.20 - 4.80 x10*3/uL 4.05   Monocytes Absolute 0.10 - 1.00 x10*3/uL 0.54   Eosinophils Absolute 0.00 - 0.70 x10*3/uL 0.06   Basophils Absolute 0.00 - 0.10 x10*3/uL 0.03   (H): Data is abnormally high  (L): Data is abnormally low         Assessment/Plan   Problem List Items Addressed This Visit             ICD-10-CM    Malabsorption ([facility]) K90.9    Vitamin D deficiency E55.9    Vitamin B12 deficiency E53.8    Hyperparathyroidism (Multi) E21.3    Iron deficiency anemia - Primary D50.9    Relevant Orders    CBC and Auto Differential    Comprehensive Metabolic Panel    Iron and TIBC    Vitamin B1, Whole Blood    Vitamin B12    S/P gastric bypass Z98.84       We reviewed the rules necessary for long term success after weight loss surgery.  We discussed the need for lifelong nutritional supplementation after weight loss surgery and they were given a handout.  We will obtain labwork at next  visit.  We discussed the importance of excercising with moderate intensity a minimum of five days per week for at least 30 minutes.  She was reminded to avoid ASA or NSAIDs for life.  We will repeat iron studies.       Antonio Brumfield MD

## 2024-11-14 NOTE — PROGRESS NOTES
Bariatric Post-Op Follow Up Appointment: 6 yr RYGB     Current Weight: 118 lbs   Current BMI: 24.66  Percentage of weight loss achieved: 102     Mame is present in the office for her 6 yr RYGB post op appt. She reports overall doing well. She reports recently losing 25 lbs after starting Ozempic for BG control. Due to this, she reports a volume size of 2 tbsp and is consuming maybe 2 meals throughout the day. She provided a diet recall of:   Breakfast- either half a hard boiled egg and a slice of turkey vee, or oatmeal   Lunch- an airfried chicken breast dipped in sauce or in a salad, OR a protein shake   Beverages: water, a protein shake, propel, sugar free flavored packets   Alcohol Intake: a glass of wine on occasion   Do you drink with your meals? No  Current Exercise: minimal due to bone-on-bone arthritis   Vitamins/minerals: One A Day Women's 50+ MVI (provides no iron), calcium citrate 2x/day, sublingual vitamin B12   Food intolerances? Breads/rice   Any decrease in energy levels? Maybe per pt.   Any questions or concerns? Decrease in hemoglobin/hematocrit lab levels since last appt. Mame was instructed to take a MVI containing iron - I recommended she switch to Celebrate MVI with 45 mg iron. I provided an educational handout.     Plan: Mame will follow up in 3 months to recheck labs.       Asiya Souza RD, LDN  Registered Dietitian, Licensed Dietitian Nutritionist

## 2024-11-19 NOTE — PROGRESS NOTES
Annual  Complaints:   hot flashes, hair loss and night sweats  Periods: menopausal  Pain:    joint pain    GYNH:   HRT: no    Last mammogram  2024  History of abnormal mammogram:     Colonoscopy   , Q 5 years  DEXA 10/2024    OB History          4    Para   4    Term   4            AB        Living             SAB        IAB        Ectopic        Multiple        Live Births                      Past Medical History:   Diagnosis Date    Diabetes mellitus (Multi)     Hypertension     Osteoarthritis of knee, unspecified     Osteoarthritis of knee    Personal history of other diseases of the circulatory system     History of hypertension    Personal history of other endocrine, nutritional and metabolic disease     History of hyperlipidemia       Past Surgical History:   Procedure Laterality Date    BARIATRIC SURGERY  2018    GASTRIC BYPASS  2018    LAPAROSCOPIC EFRAIN EN Y GASTRIC BYPASS ([facility])    HYSTERECTOMY      MR HEAD ANGIO WO IV CONTRAST  2016    MR HEAD ANGIO WO IV CONTRAST [facility]    MR NECK ANGIO WO IV CONTRAST  2016    MR NECK ANGIO WO IV CONTRAST [facility]    TUBAL LIGATION  2013    Tubal Ligation       Prior to Admission medications    Medication Sig Start Date End Date Taking? Authorizing Provider   acetaminophen (Tylenol) 325 mg capsule Take 1 capsule (325 mg) by mouth every 4 hours.    Historical Provider, MD   acetaminophen 160 mg/5 mL (5 mL) suspension Take by mouth.  Patient not taking: Reported on 2024    Historical Provider, MD   amitriptyline (Elavil) 10 mg tablet Take 1 tablet (10 mg) by mouth once daily at bedtime.  Patient not taking: Reported on 2024    Historical Provider, MD   amLODIPine (Norvasc) 10 mg tablet Take 1 tablet (10 mg) by mouth early in the morning.. 24   Historical Provider, MD   amLODIPine (Norvasc) 5 mg tablet Take 1 tablet (5 mg) by mouth once daily.  Patient not taking: Reported  on 11/14/2024 10/1/14   Historical Provider, MD   cdf-[id number]-zinc--sameer-bor 600 mg calcium- 800 unit-50 mg tablet Take 1 tablet by mouth once daily.    Historical Provider, MD   carvedilol (Coreg) 12.5 mg tablet Take 1 tablet (12.5 mg) by mouth 2 times daily (morning and late afternoon). 7/26/24   Historical Provider, MD   carvedilol (Coreg) 6.25 mg tablet Take 1 tablet (6.25 mg) by mouth once daily.  Patient not taking: Reported on 11/14/2024 11/4/13   Historical Provider, MD   cyanocobalamin, vitamin B-12, 3,000 mcg capsule as directed Sublingual    Historical Provider, MD   FreeStyle Lite Strips strip FreeStyle Lite Test In Vitro Strip   Quantity: 100  Refills: 0        Start : 8-Mar-2018   Active 3/8/18   Historical Provider, MD   insulin aspart (NovoLOG FlexPen U-100 Insulin) 100 unit/mL (3 mL) pen Inject under the skin.  Patient not taking: Reported on 11/14/2024 9/9/13   Historical Provider, MD   insulin glargine (Lantus) 100 unit/mL injection Inject under the skin.  Patient not taking: Reported on 11/14/2024 9/9/13   Historical Provider, MD   losartan (Cozaar) 100 mg tablet Take 1 tablet (100 mg) by mouth 2 times a day. 10/1/14   Historical Provider, MD   losartan-hydrochlorothiazide (Hyzaar) 100-12.5 mg tablet Take by mouth.  Patient not taking: Reported on 11/14/2024 9/9/13   Historical Provider, MD   multivit-min/iron/folic acid/K (ADULTS MULTIVITAMIN ORAL) Take by mouth.    Historical Provider, MD   polyethylene glycol 3350 (MIRALAX ORAL) Take 1 packet by mouth if needed.    Historical Provider, MD   semaglutide (OZEMPIC SUBQ) Inject under the skin.    Historical Provider, MD       Social History     Tobacco Use    Smoking status: Never    Smokeless tobacco: Never   Vaping Use    Vaping status: Never Used   Substance Use Topics    Alcohol use: Yes     Alcohol/week: 2.0 standard drinks of alcohol     Types: 2 Glasses of wine per week     Comment: Occasionally    Drug use: Yes     Frequency: 2.0 times  "per week     Types: Marijuana     Comment: Medical Marijuana patient        Objective   /74   Ht 1.499 m (4' 11\")   Wt 54.4 kg (120 lb)   BMI 24.24 kg/m²        General:   Alert and oriented, in no acute distress   Neck: Supple. No visible thyromegaly.    Breast/Axilla: Normal to palpation bilaterally without masses, skin changes, or nipple discharge.    Abdomen: Soft, non-tender, without masses or organomegaly   Vulva: Normal architecture without erythema, masses, or lesions.    Vagina: Normal mucosa without lesions, masses.  Positive atrophy. No abnormal vaginal discharge.    Cervix: absent   Uterus: absent   Adnexa: Normal without masses or lesions   Pelvic Floor No POP noted. No high tone pelvic floor    Psych  Rectal Normal affect. Normal mood.   Normal stool, no masses, guaiac negative     Assessment/Plan   Diagnoses and all orders for this visit:  Menopause  -     Thyroid Stimulating Hormone; Future  -     Follicle Stimulating Hormone; Future  -     Luteinizing Hormone; Future  Encounter for gynecological examination without abnormal finding  Hair loss  -     Thyroid Stimulating Hormone; Future  Routine annual  OB/GYN Preventive:     - Self breast exam monthly and clinical breast examination/mammogram yearly   - Screening colonoscopy recommended starting age 45, then Q3-10 years depending on testing and family history   - Osteoporosis prevention discussion included vit D3/calcium supplements, weight-bearing exercise, DEXA current  - Genitourinary skin hygiene discussed.  - Diet/Weight management discussed.  - Exercise 30-60 minutes 3-5 times/day    Karen Lorenzo MD    "

## 2024-11-20 PROBLEM — Z98.84 S/P GASTRIC BYPASS: Status: ACTIVE | Noted: 2024-11-20

## 2024-11-21 ENCOUNTER — OFFICE VISIT (OUTPATIENT)
Dept: OBSTETRICS AND GYNECOLOGY | Facility: CLINIC | Age: 54
End: 2024-11-21
Payer: COMMERCIAL

## 2024-11-21 ENCOUNTER — LAB (OUTPATIENT)
Dept: LAB | Facility: LAB | Age: 54
End: 2024-11-21
Payer: COMMERCIAL

## 2024-11-21 VITALS
DIASTOLIC BLOOD PRESSURE: 74 MMHG | HEIGHT: 59 IN | WEIGHT: 120 LBS | SYSTOLIC BLOOD PRESSURE: 110 MMHG | BODY MASS INDEX: 24.19 KG/M2

## 2024-11-21 DIAGNOSIS — L65.9 HAIR LOSS: ICD-10-CM

## 2024-11-21 DIAGNOSIS — Z78.0 MENOPAUSE: ICD-10-CM

## 2024-11-21 DIAGNOSIS — Z01.419 ENCOUNTER FOR GYNECOLOGICAL EXAMINATION WITHOUT ABNORMAL FINDING: ICD-10-CM

## 2024-11-21 DIAGNOSIS — Z78.0 MENOPAUSE: Primary | ICD-10-CM

## 2024-11-21 LAB — TSH SERPL-ACNC: 1.45 MIU/L (ref 0.44–3.98)

## 2024-11-21 PROCEDURE — 83002 ASSAY OF GONADOTROPIN (LH): CPT

## 2024-11-21 PROCEDURE — 84443 ASSAY THYROID STIM HORMONE: CPT

## 2024-11-21 PROCEDURE — 1036F TOBACCO NON-USER: CPT | Performed by: OBSTETRICS & GYNECOLOGY

## 2024-11-21 PROCEDURE — 3074F SYST BP LT 130 MM HG: CPT | Performed by: OBSTETRICS & GYNECOLOGY

## 2024-11-21 PROCEDURE — 83001 ASSAY OF GONADOTROPIN (FSH): CPT

## 2024-11-21 PROCEDURE — 3008F BODY MASS INDEX DOCD: CPT | Performed by: OBSTETRICS & GYNECOLOGY

## 2024-11-21 PROCEDURE — 36415 COLL VENOUS BLD VENIPUNCTURE: CPT

## 2024-11-21 PROCEDURE — 4010F ACE/ARB THERAPY RXD/TAKEN: CPT | Performed by: OBSTETRICS & GYNECOLOGY

## 2024-11-21 PROCEDURE — 99204 OFFICE O/P NEW MOD 45 MIN: CPT | Performed by: OBSTETRICS & GYNECOLOGY

## 2024-11-21 PROCEDURE — 99214 OFFICE O/P EST MOD 30 MIN: CPT | Performed by: OBSTETRICS & GYNECOLOGY

## 2024-11-21 PROCEDURE — 3078F DIAST BP <80 MM HG: CPT | Performed by: OBSTETRICS & GYNECOLOGY

## 2024-11-21 ASSESSMENT — LIFESTYLE VARIABLES
HOW OFTEN DO YOU HAVE SIX OR MORE DRINKS ON ONE OCCASION: NEVER
HOW MANY STANDARD DRINKS CONTAINING ALCOHOL DO YOU HAVE ON A TYPICAL DAY: 1 OR 2
AUDIT-C TOTAL SCORE: 2
SKIP TO QUESTIONS 9-10: 1
HOW OFTEN DO YOU HAVE A DRINK CONTAINING ALCOHOL: 2-4 TIMES A MONTH

## 2024-11-21 ASSESSMENT — PAIN SCALES - GENERAL: PAINLEVEL_OUTOF10: 0-NO PAIN

## 2024-11-21 ASSESSMENT — PATIENT HEALTH QUESTIONNAIRE - PHQ9
2. FEELING DOWN, DEPRESSED OR HOPELESS: NOT AT ALL
SUM OF ALL RESPONSES TO PHQ9 QUESTIONS 1 & 2: 0
1. LITTLE INTEREST OR PLEASURE IN DOING THINGS: NOT AT ALL

## 2024-11-21 ASSESSMENT — ENCOUNTER SYMPTOMS
OCCASIONAL FEELINGS OF UNSTEADINESS: 0
DEPRESSION: 0
LOSS OF SENSATION IN FEET: 0

## 2024-11-22 LAB
FSH SERPL-ACNC: 140.7 IU/L
LH SERPL-ACNC: 68.9 IU/L

## 2025-01-08 ENCOUNTER — HOSPITAL ENCOUNTER (OUTPATIENT)
Dept: RADIOLOGY | Facility: HOSPITAL | Age: 55
Discharge: HOME | End: 2025-01-08
Payer: MEDICARE

## 2025-01-08 VITALS — HEIGHT: 59 IN | WEIGHT: 120 LBS | BODY MASS INDEX: 24.19 KG/M2

## 2025-01-08 DIAGNOSIS — Z12.31 ENCOUNTER FOR SCREENING MAMMOGRAM FOR MALIGNANT NEOPLASM OF BREAST: ICD-10-CM

## 2025-01-08 PROCEDURE — 77063 BREAST TOMOSYNTHESIS BI: CPT | Performed by: RADIOLOGY

## 2025-01-08 PROCEDURE — 77067 SCR MAMMO BI INCL CAD: CPT | Performed by: RADIOLOGY

## 2025-01-08 PROCEDURE — 77067 SCR MAMMO BI INCL CAD: CPT

## 2025-02-09 LAB
ALBUMIN SERPL-MCNC: 4.3 G/DL (ref 3.6–5.1)
ALP SERPL-CCNC: 49 U/L (ref 37–153)
ALT SERPL-CCNC: 16 U/L (ref 6–29)
ANION GAP SERPL CALCULATED.4IONS-SCNC: 9 MMOL/L (CALC) (ref 7–17)
AST SERPL-CCNC: 18 U/L (ref 10–35)
BASOPHILS # BLD AUTO: 52 CELLS/UL (ref 0–200)
BASOPHILS NFR BLD AUTO: 0.6 %
BILIRUB SERPL-MCNC: 0.3 MG/DL (ref 0.2–1.2)
BUN SERPL-MCNC: 20 MG/DL (ref 7–25)
CALCIUM SERPL-MCNC: 9.2 MG/DL (ref 8.6–10.4)
CHLORIDE SERPL-SCNC: 105 MMOL/L (ref 98–110)
CO2 SERPL-SCNC: 24 MMOL/L (ref 20–32)
CREAT SERPL-MCNC: 0.75 MG/DL (ref 0.5–1.03)
EGFRCR SERPLBLD CKD-EPI 2021: 95 ML/MIN/1.73M2
EOSINOPHIL # BLD AUTO: 61 CELLS/UL (ref 15–500)
EOSINOPHIL NFR BLD AUTO: 0.7 %
ERYTHROCYTE [DISTWIDTH] IN BLOOD BY AUTOMATED COUNT: 12.7 % (ref 11–15)
GLUCOSE SERPL-MCNC: 102 MG/DL (ref 65–99)
HCT VFR BLD AUTO: 39.3 % (ref 35–45)
HGB BLD-MCNC: 12.7 G/DL (ref 11.7–15.5)
IRON SATN MFR SERPL: 37 % (CALC) (ref 16–45)
IRON SERPL-MCNC: 109 MCG/DL (ref 45–160)
LYMPHOCYTES # BLD AUTO: 4507 CELLS/UL (ref 850–3900)
LYMPHOCYTES NFR BLD AUTO: 51.8 %
MCH RBC QN AUTO: 29.1 PG (ref 27–33)
MCHC RBC AUTO-ENTMCNC: 32.3 G/DL (ref 32–36)
MCV RBC AUTO: 89.9 FL (ref 80–100)
MONOCYTES # BLD AUTO: 452 CELLS/UL (ref 200–950)
MONOCYTES NFR BLD AUTO: 5.2 %
NEUTROPHILS # BLD AUTO: 3628 CELLS/UL (ref 1500–7800)
NEUTROPHILS NFR BLD AUTO: 41.7 %
PLATELET # BLD AUTO: 317 THOUSAND/UL (ref 140–400)
PMV BLD REES-ECKER: 9.9 FL (ref 7.5–12.5)
POTASSIUM SERPL-SCNC: 4.2 MMOL/L (ref 3.5–5.3)
PROT SERPL-MCNC: 7 G/DL (ref 6.1–8.1)
RBC # BLD AUTO: 4.37 MILLION/UL (ref 3.8–5.1)
SODIUM SERPL-SCNC: 138 MMOL/L (ref 135–146)
TIBC SERPL-MCNC: 291 MCG/DL (CALC) (ref 250–450)
VIT B1 BLD-SCNC: 134 NMOL/L (ref 78–185)
VIT B12 SERPL-MCNC: 1365 PG/ML (ref 200–1100)
WBC # BLD AUTO: 8.7 THOUSAND/UL (ref 3.8–10.8)

## 2025-02-13 ENCOUNTER — APPOINTMENT (OUTPATIENT)
Dept: SURGERY | Facility: CLINIC | Age: 55
End: 2025-02-13
Payer: COMMERCIAL

## 2025-02-24 ENCOUNTER — APPOINTMENT (OUTPATIENT)
Dept: SURGERY | Facility: CLINIC | Age: 55
End: 2025-02-24
Payer: MEDICARE

## 2025-03-06 ENCOUNTER — APPOINTMENT (OUTPATIENT)
Dept: SURGERY | Facility: CLINIC | Age: 55
End: 2025-03-06
Payer: MEDICARE

## 2025-04-11 NOTE — PROGRESS NOTES
Subjective      Chief Complaint   Patient presents with    Right Hip - Pain    Right Knee - Pain        No surgery found     HPI  This 55 year old patient presents for evaluation of right hip and knee pain. She rates her right hip and knee pain at 10/10 and states that it is worse with prolonged walking, climbing stairs, squatting. She denies any recent injury or trauma to her right hip or knee. She states that her pain has been present for the last two  years and has gotten worse recently. Her hip is inflamed and the pain will wrap from the hip into her groin area. Any twisting or bending motions causes pain. She has saw Dr. Terry and pain management who had given her an injection that doesn't provide her any relief. She has tried OTC NSAIDS and Tylenol for pain management with no relief of symptoms.     CARDIOLOGY:   Negative for chest pain, shortness of breath.   RESPIRATORY:   Negative for chest pain, shortness of breath.   MUSCULOSKELETAL:   See HPI for details.   NEUROLOGY:   Negative for tingling, numbness, weakness.    Objective    There were no vitals filed for this visit.    Physical Exam  GENERAL:          General Appearance:  This is a pleasant patient with appropriate affect, in no acute distress.   DERMATOLOGY:          Skin: skin at the neck, upper and lower back, and trunk is intact. There is no evidence of skin rash, skin breakdown or ulceration, or atrophic skin change.   EXTREMITIES:          Vascular:  Right, left hands and feet are warm with good color and pulses. Right and left calf and thigh are nontender and nonswollen.   NEUROLOGICAL:          Orientation:  Patient is alert and oriented to person, place, time and situation. Right and left upper and lower extremity motor and sensory examinations are intact.      MUSCULOSKELETAL: back:   Mild-moderate tenderness. Straight leg test negative bilaterally.  left hip: Nontender.  Right hip: There is mild tenderness at the greater trochanter.  There  is pain with but only mild limitation of range of motion.   Left knee: nontender. No pain or limitation with ROM.  right knee: There is diffuse tenderness over the knee. There is pain, crepitus and limitation with ROM.  there are 3 clean, dry, well-healed incisions from previous right knee arthroscopy.McMurrays is negative. Anterior drawer and lachmans are negative. No effusion present. The knee is stable to valgus and varus stressing. The patient walks with a painful gait favoring the right knee while walking.   x-rays of the right hip done and read in the office today show mild-moderate osteoarthritis of the right hip.  Standing AP x-rays of both knees and lateral x-rays of the right knee done and read in the office today show osteoarthritis of the right knee with loss of joint surface cartilage at the medial compartment and joint incongruity.  I reviewed the x-rays listed above with the patient in the office today.  Imaging  No results found.    Cardiology, Vascular, and Other Imaging  No other imaging results found for the past 7 days       Mame was seen today for pain and pain.  Diagnoses and all orders for this visit:  Right knee pain, unspecified chronicity (Primary)  -     XR hip right with pelvis when performed 2 or 3 views; Future  -     XR knee right 1-2 views; Future  -     L Inj/Asp: R knee  Right hip pain  -     XR hip right with pelvis when performed 2 or 3 views; Future  -     XR knee right 1-2 views; Future  Primary osteoarthritis of right hip  Primary osteoarthritis of right knee  -     L Inj/Asp: R knee     Patient ID: Mame Franklin is a 55 y.o. female.    L Inj/Asp: R knee on 4/17/2025 9:28 AM  Indications: pain  Details: 22 G needle, medial approach  Medications: 40 mg triamcinolone acetonide 40 mg/mL; 1 mL lidocaine 10 mg/mL (1 %)  Outcome: tolerated well, no immediate complications  Procedure, treatment alternatives, risks and benefits explained, specific risks discussed. Immediately prior  to procedure a time out was called to verify the correct patient, procedure, equipment, support staff and site/side marked as required. Patient was prepped and draped in the usual sterile fashion.               Options are discussed with the patient in detail. The patient is instructed regarding activity modification, ice, physician directed at home gentle strengthening and ROM exercises, and the appropriate use of Tylenol as needed for pain with its potential adverse reactions and side effects. The patient understands. The patient states that despite all the treatment listed above that this right knee pain is debilitating and  requests a discussion of further options. Cortisone injection to the right knee is discussed in the office today. This is done in the office today. See procedures below. Return as needed, Please note that this report has been produced using speech recognition software.  It may contain errors related to grammar, punctuation or spelling.  Electronically signed, but not reviewed.     Erasmo Jalloh MD

## 2025-04-17 ENCOUNTER — OFFICE VISIT (OUTPATIENT)
Dept: ORTHOPEDIC SURGERY | Facility: CLINIC | Age: 55
End: 2025-04-17
Payer: COMMERCIAL

## 2025-04-17 ENCOUNTER — HOSPITAL ENCOUNTER (OUTPATIENT)
Dept: RADIOLOGY | Facility: CLINIC | Age: 55
Discharge: HOME | End: 2025-04-17
Payer: COMMERCIAL

## 2025-04-17 VITALS — BODY MASS INDEX: 24.19 KG/M2 | HEIGHT: 59 IN | WEIGHT: 120 LBS

## 2025-04-17 DIAGNOSIS — M17.11 PRIMARY OSTEOARTHRITIS OF RIGHT KNEE: ICD-10-CM

## 2025-04-17 DIAGNOSIS — M25.561 RIGHT KNEE PAIN, UNSPECIFIED CHRONICITY: ICD-10-CM

## 2025-04-17 DIAGNOSIS — M25.561 RIGHT KNEE PAIN, UNSPECIFIED CHRONICITY: Primary | ICD-10-CM

## 2025-04-17 DIAGNOSIS — M25.551 RIGHT HIP PAIN: ICD-10-CM

## 2025-04-17 DIAGNOSIS — M16.11 PRIMARY OSTEOARTHRITIS OF RIGHT HIP: ICD-10-CM

## 2025-04-17 PROCEDURE — 4010F ACE/ARB THERAPY RXD/TAKEN: CPT | Performed by: ORTHOPAEDIC SURGERY

## 2025-04-17 PROCEDURE — 99203 OFFICE O/P NEW LOW 30 MIN: CPT | Performed by: ORTHOPAEDIC SURGERY

## 2025-04-17 PROCEDURE — 3008F BODY MASS INDEX DOCD: CPT | Performed by: ORTHOPAEDIC SURGERY

## 2025-04-17 PROCEDURE — 73560 X-RAY EXAM OF KNEE 1 OR 2: CPT | Mod: RT

## 2025-04-17 PROCEDURE — 20610 DRAIN/INJ JOINT/BURSA W/O US: CPT | Mod: RT | Performed by: ORTHOPAEDIC SURGERY

## 2025-04-17 PROCEDURE — 1036F TOBACCO NON-USER: CPT | Performed by: ORTHOPAEDIC SURGERY

## 2025-04-17 PROCEDURE — 73502 X-RAY EXAM HIP UNI 2-3 VIEWS: CPT | Mod: RIGHT SIDE | Performed by: ORTHOPAEDIC SURGERY

## 2025-04-17 PROCEDURE — 2500000004 HC RX 250 GENERAL PHARMACY W/ HCPCS (ALT 636 FOR OP/ED): Performed by: ORTHOPAEDIC SURGERY

## 2025-04-17 PROCEDURE — 99213 OFFICE O/P EST LOW 20 MIN: CPT | Mod: 25 | Performed by: ORTHOPAEDIC SURGERY

## 2025-04-17 PROCEDURE — 73502 X-RAY EXAM HIP UNI 2-3 VIEWS: CPT | Mod: RT

## 2025-04-17 RX ORDER — TRIAMCINOLONE ACETONIDE 40 MG/ML
40 INJECTION, SUSPENSION INTRA-ARTICULAR; INTRAMUSCULAR
Status: COMPLETED | OUTPATIENT
Start: 2025-04-17 | End: 2025-04-17

## 2025-04-17 RX ORDER — LIDOCAINE HYDROCHLORIDE 10 MG/ML
1 INJECTION, SOLUTION INFILTRATION; PERINEURAL
Status: COMPLETED | OUTPATIENT
Start: 2025-04-17 | End: 2025-04-17

## 2025-04-17 RX ADMIN — TRIAMCINOLONE ACETONIDE 40 MG: 400 INJECTION, SUSPENSION INTRA-ARTICULAR; INTRAMUSCULAR at 09:28

## 2025-04-17 RX ADMIN — LIDOCAINE HYDROCHLORIDE 1 ML: 10 INJECTION, SOLUTION INFILTRATION; PERINEURAL at 09:28

## 2025-04-17 ASSESSMENT — PAIN - FUNCTIONAL ASSESSMENT: PAIN_FUNCTIONAL_ASSESSMENT: 0-10

## 2025-04-17 ASSESSMENT — LIFESTYLE VARIABLES
HOW OFTEN DO YOU HAVE SIX OR MORE DRINKS ON ONE OCCASION: LESS THAN MONTHLY
HOW OFTEN DO YOU HAVE A DRINK CONTAINING ALCOHOL: MONTHLY OR LESS

## 2025-04-17 ASSESSMENT — ENCOUNTER SYMPTOMS
OCCASIONAL FEELINGS OF UNSTEADINESS: 0
LOSS OF SENSATION IN FEET: 0
DEPRESSION: 0

## 2025-04-17 ASSESSMENT — PAIN SCALES - GENERAL
PAINLEVEL_OUTOF10: 10 - WORST POSSIBLE PAIN
PAINLEVEL_OUTOF10: 10-WORST PAIN EVER

## 2025-04-17 NOTE — PATIENT INSTRUCTIONS
Thank you for coming to see us today!     Continue to use tylenol for pain control.   Rest, ice and elevate and remember to do exercises.   If you are interested in another cortisone injection you must wait 4 months from today's visit    Follow up as needed

## 2025-06-11 ENCOUNTER — OFFICE VISIT (OUTPATIENT)
Dept: URGENT CARE | Age: 55
End: 2025-06-11
Payer: COMMERCIAL

## 2025-06-11 VITALS
BODY MASS INDEX: 24.19 KG/M2 | RESPIRATION RATE: 20 BRPM | SYSTOLIC BLOOD PRESSURE: 124 MMHG | HEIGHT: 59 IN | DIASTOLIC BLOOD PRESSURE: 79 MMHG | HEART RATE: 99 BPM | OXYGEN SATURATION: 98 % | WEIGHT: 120 LBS | TEMPERATURE: 97.5 F

## 2025-06-11 DIAGNOSIS — R42 DIZZINESS: ICD-10-CM

## 2025-06-11 DIAGNOSIS — M16.11 OSTEOARTHRITIS OF RIGHT HIP, UNSPECIFIED OSTEOARTHRITIS TYPE: ICD-10-CM

## 2025-06-11 DIAGNOSIS — R11.2 NAUSEA AND VOMITING, UNSPECIFIED VOMITING TYPE: Primary | ICD-10-CM

## 2025-06-11 LAB — POC FINGERSTICK BLOOD GLUCOSE: 189 MG/DL (ref 70–100)

## 2025-06-11 RX ORDER — ONDANSETRON 4 MG/1
4 TABLET, ORALLY DISINTEGRATING ORAL EVERY 8 HOURS PRN
Qty: 9 TABLET | Refills: 0 | Status: SHIPPED | OUTPATIENT
Start: 2025-06-11 | End: 2025-06-14

## 2025-06-11 RX ORDER — METHYLPREDNISOLONE 4 MG/1
TABLET ORAL
Qty: 21 TABLET | Refills: 0 | Status: SHIPPED | OUTPATIENT
Start: 2025-06-11 | End: 2025-06-17

## 2025-06-11 RX ORDER — ONDANSETRON 4 MG/1
4 TABLET, ORALLY DISINTEGRATING ORAL ONCE
Status: COMPLETED | OUTPATIENT
Start: 2025-06-11 | End: 2025-06-11

## 2025-06-11 RX ORDER — GLUCOSAM/CHON-MSM1/C/MANG/BOSW 500-416.6
TABLET ORAL
COMMUNITY
Start: 2025-03-10

## 2025-06-11 RX ORDER — TRAMADOL HYDROCHLORIDE 50 MG/1
TABLET, FILM COATED ORAL
COMMUNITY
Start: 2025-06-10 | End: 2025-06-11 | Stop reason: WASHOUT

## 2025-06-11 RX ORDER — PANTOPRAZOLE SODIUM 40 MG/1
TABLET, DELAYED RELEASE ORAL
COMMUNITY
Start: 2025-06-10

## 2025-06-11 RX ORDER — CALCIUM CITRATE/VITAMIN D3 200MG-6.25
TABLET ORAL 2 TIMES DAILY
COMMUNITY
Start: 2025-03-10

## 2025-06-11 RX ORDER — SEMAGLUTIDE 1.34 MG/ML
1 INJECTION, SOLUTION SUBCUTANEOUS
COMMUNITY
Start: 2025-05-30

## 2025-06-11 RX ADMIN — ONDANSETRON 4 MG: 4 TABLET, ORALLY DISINTEGRATING ORAL at 16:30

## 2025-06-11 ASSESSMENT — PATIENT HEALTH QUESTIONNAIRE - PHQ9
SUM OF ALL RESPONSES TO PHQ9 QUESTIONS 1 & 2: 0
1. LITTLE INTEREST OR PLEASURE IN DOING THINGS: NOT AT ALL
2. FEELING DOWN, DEPRESSED OR HOPELESS: NOT AT ALL

## 2025-06-11 NOTE — PATIENT INSTRUCTIONS
Go to emergency department for severe new or worsening symptoms such as chest pain, passing out, difficulty breathing, vomiting with inability to tolerate liquids, weakness, numbness, visual changes, speech changes or anything else which concerns you.  Do not take any further tramadol.  Trial medrol dose pack instead, monitor for increased blood glucose.  If blood sugar is 250 or higher discontinue the steroids. Follow up with your primary care provider for recheck.

## 2025-06-11 NOTE — PROGRESS NOTES
"Subjective   Patient ID: Mame Franklin is a 55 y.o. female. They present today with a chief complaint of Vomiting (PT started tramadol this morning at 5am. ) and Nausea.    History of Present Illness  See mdm          Past Medical History  Allergies as of 06/11/2025 - Reviewed 06/11/2025   Allergen Reaction Noted    Amoxicillin Unknown 04/25/2016    Metformin Unknown 09/12/2023    Tramadol GI Upset 06/11/2025       Prescriptions Prior to Admission[1]     Medical History[2]    Surgical History[3]     reports that she has never smoked. She has never used smokeless tobacco. She reports current alcohol use of about 2.0 standard drinks of alcohol per week. She reports current drug use. Frequency: 2.00 times per week. Drug: Marijuana.    Review of Systems  Review of Systems   All other systems reviewed and are negative.                                 Objective    Vitals:    06/11/25 1624 06/11/25 1718   BP: (!) 156/115 124/79   Pulse: 99    Resp: 20    Temp: 36.4 °C (97.5 °F)    TempSrc: Oral    SpO2: 98%    Weight: 54.4 kg (120 lb)    Height: 1.499 m (4' 11\")      No LMP recorded. Patient has had a hysterectomy.    Physical Exam  Vitals and nursing note reviewed.   Constitutional:       General: She is not in acute distress.     Appearance: Normal appearance. She is normal weight. She is ill-appearing. She is not toxic-appearing or diaphoretic.      Comments: Dry heaving, appearing as though she does not feel well    HENT:      Head: Normocephalic and atraumatic.      Mouth/Throat:      Mouth: Mucous membranes are moist.   Eyes:      General: No scleral icterus.        Right eye: No discharge.         Left eye: No discharge.      Extraocular Movements: Extraocular movements intact.      Conjunctiva/sclera: Conjunctivae normal.      Pupils: Pupils are equal, round, and reactive to light.   Cardiovascular:      Rate and Rhythm: Normal rate and regular rhythm.      Pulses: Normal pulses.      Heart sounds: Normal heart " sounds. No murmur heard.     No friction rub. No gallop.   Pulmonary:      Effort: Pulmonary effort is normal. No respiratory distress.      Breath sounds: No wheezing, rhonchi or rales.   Abdominal:      General: Abdomen is flat.      Tenderness: There is no abdominal tenderness. There is no guarding or rebound.   Musculoskeletal:         General: No tenderness, deformity or signs of injury. Normal range of motion.      Cervical back: Normal range of motion and neck supple. No rigidity or tenderness.      Comments: ROM right hip intact, reports pain is reproduced with ROM of the hip.  No TTP, bony deformity.  Ambulatory with steady gait   Lymphadenopathy:      Cervical: No cervical adenopathy.   Skin:     General: Skin is warm and dry.      Capillary Refill: Capillary refill takes less than 2 seconds.      Coloration: Skin is not jaundiced or pale.      Findings: No rash.   Neurological:      General: No focal deficit present.      Mental Status: She is alert and oriented to person, place, and time.      Cranial Nerves: No cranial nerve deficit.      Sensory: No sensory deficit.      Motor: No weakness.      Coordination: Coordination normal.      Gait: Gait normal.      Comments: A&Ox4.   NIH 0.  Cranial nerves II through XII grossly intact.   5/5 strength in upper and lower extremities intact.  Sensation intact and equal throughout.   No ataxia with finger-nose or heel-to-shin.  PERRLA.  EOMI.  Visual fields intact.  Normal speech.  Normal stable gait with ambulation.  Normal mentation, insight and judgement.     Psychiatric:         Mood and Affect: Mood normal.         Behavior: Behavior normal.         Procedures    Point of Care Test & Imaging Results from this visit  Results for orders placed or performed in visit on 06/11/25   POCT fingerstick glucose manually resulted   Result Value Ref Range    POC Fingerstick Blood Glucose 189 (A) 70 - 100 mg/dl      Imaging  No results found.    Cardiology, Vascular,  and Other Imaging  No other imaging results found for the past 2 days      Diagnostic study results (if any) were reviewed by Merary Fenton PA-C.    Assessment/Plan   Allergies, medications, history, and pertinent labs/EKGs/Imaging reviewed by Merary Fenton PA-C.     Medical Decision Making  55-year-old female with past medical history of non-insulin-dependent diabetes mellitus type 2, CHF, hypertension, gastric bypass presents with complaint of nausea, vomiting, dizziness.  She has taken 3 doses of tramadol which was prescribed yesterday for PCP for chronic right hip pain and osteoarthritis.  She tolerated the first few doses however after taking the third dose this morning she has had persistent nausea and vomiting.  Treated with Zofran in clinic.  Following Zofran nausea and vomiting is resolved although she is complaining of dizziness particularly with changing positions.  Described as room spinning.  POC glucose and EKG added.   NIH exam is normal.  Remainder of exam benign.   No chest pain, shortness of breath.  No features anaphylaxis.  Point-of-care glucose 185.   EKG no STEMI or arrhythmia.  Symptoms consistent with intolerance of tramadol she has not taken this medication before and no side effects can be nausea vomiting and dizziness.   Will provide zofran for n/v, advised to medrol dose pack for hip pain.  Discontinue the tramadol, added to allergy list.  Monitor BG while taking medrol dose pack, begin tomorrow once n/v has improved.  Strict presentation to ED for new or worsening symptoms.   will drive her home.  No driving while having dizziness.  She is ambulatory with steady gait.  No features CVA, TIA, ACS, acute abdomen or other emergency.  Encouraged follow-up with primary care provider.  Discussed expected course, indications for return or for presentation to emergency department.  Discharged good condition agreeable to plan as discussed.  Case discussed with attending physician.       Orders and Diagnoses  Diagnoses and all orders for this visit:  Nausea and vomiting, unspecified vomiting type  -     ondansetron ODT (Zofran-ODT) disintegrating tablet 4 mg  -     ondansetron ODT (Zofran-ODT) 4 mg disintegrating tablet; Dissolve 1 tablet (4 mg) in the mouth every 8 hours if needed for nausea or vomiting for up to 3 days.  Dizziness  -     ECG 12 Lead  -     POCT fingerstick glucose manually resulted  Osteoarthritis of right hip, unspecified osteoarthritis type  -     methylPREDNISolone (Medrol Dospak) 4 mg tablets; Follow schedule on package instructions      Medical Admin Record  Administrations This Visit       ondansetron ODT (Zofran-ODT) disintegrating tablet 4 mg       Admin Date  06/11/2025 Action  Given Dose  4 mg Route  oral Documented By  Brook Markley, MA                    Patient disposition: Home    Electronically signed by Merary Fenton PA-C  6:49 PM           [1] (Not in a hospital admission)   [2]   Past Medical History:  Diagnosis Date    Diabetes mellitus (Multi)     Hypertension 2000    Osteoarthritis of knee, unspecified     Osteoarthritis of knee    Personal history of other diseases of the circulatory system     History of hypertension    Personal history of other endocrine, nutritional and metabolic disease     History of hyperlipidemia   [3]   Past Surgical History:  Procedure Laterality Date    BARIATRIC SURGERY  Nov 2018    GASTRIC BYPASS  11/19/2018    LAPAROSCOPIC EFRAIN EN Y GASTRIC BYPASS ([facility])    HYSTERECTOMY  2016    MR HEAD ANGIO WO IV CONTRAST  05/17/2016    MR HEAD ANGIO WO IV CONTRAST [facility]    MR NECK ANGIO WO IV CONTRAST  05/17/2016    MR NECK ANGIO WO IV CONTRAST [facility]    TUBAL LIGATION  12/19/2013    Tubal Ligation

## 2025-06-24 ENCOUNTER — APPOINTMENT (OUTPATIENT)
Dept: RADIOLOGY | Facility: CLINIC | Age: 55
End: 2025-06-24
Payer: COMMERCIAL

## 2025-07-09 ENCOUNTER — APPOINTMENT (OUTPATIENT)
Dept: RADIOLOGY | Facility: CLINIC | Age: 55
End: 2025-07-09
Payer: COMMERCIAL

## 2025-07-09 DIAGNOSIS — M16.11 UNILATERAL PRIMARY OSTEOARTHRITIS, RIGHT HIP: ICD-10-CM

## 2025-07-09 PROCEDURE — 73721 MRI JNT OF LWR EXTRE W/O DYE: CPT | Mod: RIGHT SIDE | Performed by: RADIOLOGY

## 2025-07-09 PROCEDURE — 73721 MRI JNT OF LWR EXTRE W/O DYE: CPT | Mod: RT

## 2025-07-16 DIAGNOSIS — K90.9 INTESTINAL MALABSORPTION, UNSPECIFIED TYPE (HHS-HCC): Primary | ICD-10-CM

## 2025-07-23 ENCOUNTER — APPOINTMENT (OUTPATIENT)
Dept: SURGERY | Facility: CLINIC | Age: 55
End: 2025-07-23
Payer: COMMERCIAL

## 2025-07-27 LAB
25(OH)D3+25(OH)D2 SERPL-MCNC: 51 NG/ML (ref 30–100)
ALBUMIN SERPL-MCNC: 4.3 G/DL (ref 3.6–5.1)
ALP SERPL-CCNC: 42 U/L (ref 37–153)
ALT SERPL-CCNC: 15 U/L (ref 6–29)
ANION GAP SERPL CALCULATED.4IONS-SCNC: 9 MMOL/L (CALC) (ref 7–17)
AST SERPL-CCNC: 16 U/L (ref 10–35)
BASOPHILS # BLD AUTO: 31 CELLS/UL (ref 0–200)
BASOPHILS NFR BLD AUTO: 0.5 %
BILIRUB SERPL-MCNC: 0.6 MG/DL (ref 0.2–1.2)
BUN SERPL-MCNC: 11 MG/DL (ref 7–25)
CALCIUM SERPL-MCNC: 9.2 MG/DL (ref 8.6–10.4)
CHLORIDE SERPL-SCNC: 107 MMOL/L (ref 98–110)
CO2 SERPL-SCNC: 24 MMOL/L (ref 20–32)
COPPER BLD-MCNC: NORMAL UG/DL
CREAT SERPL-MCNC: 0.73 MG/DL (ref 0.5–1.03)
EGFRCR SERPLBLD CKD-EPI 2021: 97 ML/MIN/1.73M2
EOSINOPHIL # BLD AUTO: 49 CELLS/UL (ref 15–500)
EOSINOPHIL NFR BLD AUTO: 0.8 %
ERYTHROCYTE [DISTWIDTH] IN BLOOD BY AUTOMATED COUNT: 12.8 % (ref 11–15)
FERRITIN SERPL-MCNC: 151 NG/ML (ref 16–232)
FOLATE SERPL-MCNC: >24 NG/ML
GLUCOSE SERPL-MCNC: 89 MG/DL (ref 65–139)
HCT VFR BLD AUTO: 38 % (ref 35–45)
HGB BLD-MCNC: 12.3 G/DL (ref 11.7–15.5)
IRON SATN MFR SERPL: 42 % (CALC) (ref 16–45)
IRON SERPL-MCNC: 123 MCG/DL (ref 45–160)
LYMPHOCYTES # BLD AUTO: 2849 CELLS/UL (ref 850–3900)
LYMPHOCYTES NFR BLD AUTO: 46.7 %
MCH RBC QN AUTO: 29.6 PG (ref 27–33)
MCHC RBC AUTO-ENTMCNC: 32.4 G/DL (ref 32–36)
MCV RBC AUTO: 91.6 FL (ref 80–100)
MONOCYTES # BLD AUTO: 390 CELLS/UL (ref 200–950)
MONOCYTES NFR BLD AUTO: 6.4 %
NEUTROPHILS # BLD AUTO: 2782 CELLS/UL (ref 1500–7800)
NEUTROPHILS NFR BLD AUTO: 45.6 %
PLATELET # BLD AUTO: 264 THOUSAND/UL (ref 140–400)
PMV BLD REES-ECKER: 9.8 FL (ref 7.5–12.5)
POTASSIUM SERPL-SCNC: 4 MMOL/L (ref 3.5–5.3)
PROT SERPL-MCNC: 6.9 G/DL (ref 6.1–8.1)
PTH-INTACT SERPL-MCNC: 26 PG/ML (ref 16–77)
RBC # BLD AUTO: 4.15 MILLION/UL (ref 3.8–5.1)
SODIUM SERPL-SCNC: 140 MMOL/L (ref 135–146)
TIBC SERPL-MCNC: 293 MCG/DL (CALC) (ref 250–450)
VIT B1 BLD-SCNC: 118 NMOL/L (ref 78–185)
VIT B12 SERPL-MCNC: 1287 PG/ML (ref 200–1100)
WBC # BLD AUTO: 6.1 THOUSAND/UL (ref 3.8–10.8)
ZINC SERPL-MCNC: 58 MCG/DL (ref 60–130)

## 2025-07-28 ENCOUNTER — OFFICE VISIT (OUTPATIENT)
Dept: SURGERY | Facility: CLINIC | Age: 55
End: 2025-07-28
Payer: COMMERCIAL

## 2025-07-28 VITALS — BODY MASS INDEX: 23.03 KG/M2 | WEIGHT: 114 LBS

## 2025-07-28 DIAGNOSIS — Z98.84 S/P GASTRIC BYPASS: ICD-10-CM

## 2025-07-28 DIAGNOSIS — G47.33 OBSTRUCTIVE SLEEP APNEA SYNDROME: ICD-10-CM

## 2025-07-28 DIAGNOSIS — E55.9 VITAMIN D DEFICIENCY: ICD-10-CM

## 2025-07-28 DIAGNOSIS — D50.8 IRON DEFICIENCY ANEMIA SECONDARY TO INADEQUATE DIETARY IRON INTAKE: ICD-10-CM

## 2025-07-28 DIAGNOSIS — E53.8 VITAMIN B12 DEFICIENCY: ICD-10-CM

## 2025-07-28 DIAGNOSIS — K90.9 INTESTINAL MALABSORPTION, UNSPECIFIED TYPE (HHS-HCC): Primary | ICD-10-CM

## 2025-07-28 DIAGNOSIS — E21.3 HYPERPARATHYROIDISM (MULTI): ICD-10-CM

## 2025-07-28 LAB
25(OH)D3+25(OH)D2 SERPL-MCNC: 51 NG/ML (ref 30–100)
ALBUMIN SERPL-MCNC: 4.3 G/DL (ref 3.6–5.1)
ALP SERPL-CCNC: 42 U/L (ref 37–153)
ALT SERPL-CCNC: 15 U/L (ref 6–29)
ANION GAP SERPL CALCULATED.4IONS-SCNC: 9 MMOL/L (CALC) (ref 7–17)
AST SERPL-CCNC: 16 U/L (ref 10–35)
BASOPHILS # BLD AUTO: 31 CELLS/UL (ref 0–200)
BASOPHILS NFR BLD AUTO: 0.5 %
BILIRUB SERPL-MCNC: 0.6 MG/DL (ref 0.2–1.2)
BUN SERPL-MCNC: 11 MG/DL (ref 7–25)
CALCIUM SERPL-MCNC: 9.2 MG/DL (ref 8.6–10.4)
CHLORIDE SERPL-SCNC: 107 MMOL/L (ref 98–110)
CO2 SERPL-SCNC: 24 MMOL/L (ref 20–32)
COPPER BLD-MCNC: 81 MCG/DL
CREAT SERPL-MCNC: 0.73 MG/DL (ref 0.5–1.03)
EGFRCR SERPLBLD CKD-EPI 2021: 97 ML/MIN/1.73M2
EOSINOPHIL # BLD AUTO: 49 CELLS/UL (ref 15–500)
EOSINOPHIL NFR BLD AUTO: 0.8 %
ERYTHROCYTE [DISTWIDTH] IN BLOOD BY AUTOMATED COUNT: 12.8 % (ref 11–15)
FERRITIN SERPL-MCNC: 151 NG/ML (ref 16–232)
FOLATE SERPL-MCNC: >24 NG/ML
GLUCOSE SERPL-MCNC: 89 MG/DL (ref 65–139)
HCT VFR BLD AUTO: 38 % (ref 35–45)
HGB BLD-MCNC: 12.3 G/DL (ref 11.7–15.5)
IRON SATN MFR SERPL: 42 % (CALC) (ref 16–45)
IRON SERPL-MCNC: 123 MCG/DL (ref 45–160)
LYMPHOCYTES # BLD AUTO: 2849 CELLS/UL (ref 850–3900)
LYMPHOCYTES NFR BLD AUTO: 46.7 %
MCH RBC QN AUTO: 29.6 PG (ref 27–33)
MCHC RBC AUTO-ENTMCNC: 32.4 G/DL (ref 32–36)
MCV RBC AUTO: 91.6 FL (ref 80–100)
MONOCYTES # BLD AUTO: 390 CELLS/UL (ref 200–950)
MONOCYTES NFR BLD AUTO: 6.4 %
NEUTROPHILS # BLD AUTO: 2782 CELLS/UL (ref 1500–7800)
NEUTROPHILS NFR BLD AUTO: 45.6 %
PLATELET # BLD AUTO: 264 THOUSAND/UL (ref 140–400)
PMV BLD REES-ECKER: 9.8 FL (ref 7.5–12.5)
POTASSIUM SERPL-SCNC: 4 MMOL/L (ref 3.5–5.3)
PROT SERPL-MCNC: 6.9 G/DL (ref 6.1–8.1)
PTH-INTACT SERPL-MCNC: 26 PG/ML (ref 16–77)
RBC # BLD AUTO: 4.15 MILLION/UL (ref 3.8–5.1)
SODIUM SERPL-SCNC: 140 MMOL/L (ref 135–146)
TIBC SERPL-MCNC: 293 MCG/DL (CALC) (ref 250–450)
VIT B1 BLD-SCNC: 118 NMOL/L (ref 78–185)
VIT B12 SERPL-MCNC: 1287 PG/ML (ref 200–1100)
WBC # BLD AUTO: 6.1 THOUSAND/UL (ref 3.8–10.8)
ZINC SERPL-MCNC: 58 MCG/DL (ref 60–130)

## 2025-07-28 PROCEDURE — 99214 OFFICE O/P EST MOD 30 MIN: CPT

## 2025-07-28 PROCEDURE — 4010F ACE/ARB THERAPY RXD/TAKEN: CPT

## 2025-07-28 NOTE — PROGRESS NOTES
Subjective   Patient ID: Mame Franklin is a 55 y.o. female who presents for No chief complaint on file..  ELLEN Vilchis is here for her 7 year follow up after a RYGB. She has maintained a weight loss of 108%  She is having some reflux. She is taking pantoprazole daily. She does not feel hunger. She is taking ozempic 1mg every other week. It can take a small saucer to feel full. Some days she can finish it. She tells me that she cannot finish a sandwich. She may use a shake to get her protein in. She is not snacking. She was walking for exercise. She currently has groin pain. She is following up with arthritis dr for this. She is taking celebrate 45, calcium citrate BID, hair/skin/nails. She had labwork for this appointment, which we reviewed.       Objective   Physical Exam  Constitutional:       Appearance: Normal appearance.     Eyes:      Pupils: Pupils are equal, round, and reactive to light.       Cardiovascular:      Rate and Rhythm: Normal rate.   Pulmonary:      Effort: Pulmonary effort is normal.   Abdominal:      Palpations: Abdomen is soft.     Musculoskeletal:         General: Normal range of motion.     Skin:     General: Skin is warm and dry.     Neurological:      General: No focal deficit present.      Mental Status: She is alert and oriented to person, place, and time.     Psychiatric:         Mood and Affect: Mood normal.          Latest Reference Range & Units 07/22/25 07:47   GLUCOSE 65 - 139 mg/dL 89   SODIUM 135 - 146 mmol/L 140   POTASSIUM 3.5 - 5.3 mmol/L 4.0   CHLORIDE 98 - 110 mmol/L 107   CARBON DIOXIDE 20 - 32 mmol/L 24   ELECTROLYTE BALANCE 7 - 17 mmol/L (calc) 9   UREA NITROGEN (BUN) 7 - 25 mg/dL 11   CREATININE 0.50 - 1.03 mg/dL 0.73   EGFR > OR = 60 mL/min/1.73m2 97   CALCIUM 8.6 - 10.4 mg/dL 9.2   ALBUMIN 3.6 - 5.1 g/dL 4.3   PROTEIN, TOTAL 6.1 - 8.1 g/dL 6.9   ALKALINE PHOSPHATASE 37 - 153 U/L 42   ALT 6 - 29 U/L 15   AST 10 - 35 U/L 16   BILIRUBIN, TOTAL 0.2 - 1.2 mg/dL 0.6    FERRITIN 16 - 232 ng/mL 151   FOLATE, SERUM ng/mL >24.0   IRON, TOTAL 45 - 160 mcg/dL 123   IRON BINDING CAPACITY 250 - 450 mcg/dL (calc) 293   % SATURATION 16 - 45 % (calc) 42   VITAMIN B12 200 - 1,100 pg/mL 1,287 (H)   COPPER, BLOOD  COMMENT ONLY (P)   ZINC 60 - 130 mcg/dL 58 (L)   VITAMIN B1 (THIAMINE), BLOOD, LC/MS/MS 78 - 185 nmol/L 118   PARATHYROID HORMONE, INTACT 16 - 77 pg/mL 26   VITAMIN D,25-OH,TOTAL,IA 30 - 100 ng/mL 51   WHITE BLOOD CELL COUNT 3.8 - 10.8 Thousand/uL 6.1   RED BLOOD CELL COUNT 3.80 - 5.10 Million/uL 4.15   HEMOGLOBIN 11.7 - 15.5 g/dL 12.3   HEMATOCRIT 35.0 - 45.0 % 38.0   MCV 80.0 - 100.0 fL 91.6   MCH 27.0 - 33.0 pg 29.6   MCHC 32.0 - 36.0 g/dL 32.4   RDW 11.0 - 15.0 % 12.8   PLATELET COUNT 140 - 400 Thousand/uL 264   MPV 7.5 - 12.5 fL 9.8   ABSOLUTE NEUTROPHILS 1,500 - 7,800 cells/uL 2,782   ABSOLUTE LYMPHOCYTES 850 - 3,900 cells/uL 2,849   ABSOLUTE MONOCYTES 200 - 950 cells/uL 390   ABSOLUTE EOSINOPHILS 15 - 500 cells/uL 49   ABSOLUTE BASOPHILS 0 - 200 cells/uL 31   NEUTROPHILS % 45.6   LYMPHOCYTES % 46.7   MONOCYTES % 6.4   EOSINOPHILS % 0.8   BASOPHILS % 0.5     Assessment/Plan   Problem List Items Addressed This Visit           ICD-10-CM    Malabsorption ([facility]-HCC) - Primary K90.9    Relevant Orders    CBC and Auto Differential    Comprehensive Metabolic Panel    Copper, Blood    Ferritin    Folate    Iron and TIBC    Parathyroid Hormone, Intact    Vitamin B1, Whole Blood    Vitamin B12    Vitamin D 25-Hydroxy,Total (for eval of Vitamin D levels)    Zinc, Serum or Plasma    Obstructive sleep apnea syndrome G47.33    Relevant Orders    CBC and Auto Differential    Comprehensive Metabolic Panel    Copper, Blood    Ferritin    Folate    Iron and TIBC    Parathyroid Hormone, Intact    Vitamin B1, Whole Blood    Vitamin B12    Vitamin D 25-Hydroxy,Total (for eval of Vitamin D levels)    Zinc, Serum or Plasma    Vitamin D deficiency E55.9    Relevant Orders    Vitamin D 25-Hydroxy,Total  (for eval of Vitamin D levels)    Vitamin B12 deficiency E53.8    Relevant Orders    Vitamin B12    Hyperparathyroidism (Multi) E21.3    Relevant Orders    Parathyroid Hormone, Intact    Iron deficiency anemia D50.9    Relevant Orders    Ferritin    Iron and TIBC    S/P gastric bypass Z98.84    Relevant Orders    Referral to Plastic Surgery     Mame is doing well with her weight maintenance. I encouraged her to continue to follow the rules for continued weight maintenance. I reminded her that a RYGB is not compatible with ASA or NSAID use. We discussed that her zinc is only slightly low. She could add an OTC supplement once to twice per week. We will recheck labwork in 1 year at her next follow up.     Monique Timmons, LENA-CNP 07/28/25 8:43 AM

## 2025-07-31 ENCOUNTER — APPOINTMENT (OUTPATIENT)
Dept: ORTHOPEDIC SURGERY | Facility: CLINIC | Age: 55
End: 2025-07-31
Payer: COMMERCIAL

## 2025-08-14 ENCOUNTER — APPOINTMENT (OUTPATIENT)
Dept: PLASTIC SURGERY | Facility: CLINIC | Age: 55
End: 2025-08-14
Payer: COMMERCIAL

## 2025-08-28 ENCOUNTER — APPOINTMENT (OUTPATIENT)
Dept: PLASTIC SURGERY | Facility: CLINIC | Age: 55
End: 2025-08-28
Payer: COMMERCIAL

## 2025-08-28 VITALS
BODY MASS INDEX: 22.82 KG/M2 | DIASTOLIC BLOOD PRESSURE: 85 MMHG | WEIGHT: 113.2 LBS | SYSTOLIC BLOOD PRESSURE: 138 MMHG | HEIGHT: 59 IN | HEART RATE: 79 BPM

## 2025-08-28 DIAGNOSIS — E66.9 EXCESSIVE SUBCUTANEOUS FAT: Primary | ICD-10-CM

## 2025-08-28 PROCEDURE — 1036F TOBACCO NON-USER: CPT | Performed by: PHYSICIAN ASSISTANT

## 2025-08-28 PROCEDURE — 3075F SYST BP GE 130 - 139MM HG: CPT | Performed by: PHYSICIAN ASSISTANT

## 2025-08-28 PROCEDURE — 99204 OFFICE O/P NEW MOD 45 MIN: CPT | Performed by: PHYSICIAN ASSISTANT

## 2025-08-28 PROCEDURE — 3008F BODY MASS INDEX DOCD: CPT | Performed by: PHYSICIAN ASSISTANT

## 2025-08-28 PROCEDURE — 3079F DIAST BP 80-89 MM HG: CPT | Performed by: PHYSICIAN ASSISTANT

## 2025-08-28 PROCEDURE — 4010F ACE/ARB THERAPY RXD/TAKEN: CPT | Performed by: PHYSICIAN ASSISTANT

## 2025-09-09 ENCOUNTER — APPOINTMENT (OUTPATIENT)
Dept: PLASTIC SURGERY | Facility: CLINIC | Age: 55
End: 2025-09-09
Payer: COMMERCIAL

## 2025-09-23 ENCOUNTER — APPOINTMENT (OUTPATIENT)
Dept: PLASTIC SURGERY | Facility: CLINIC | Age: 55
End: 2025-09-23
Payer: COMMERCIAL